# Patient Record
Sex: FEMALE | Race: BLACK OR AFRICAN AMERICAN | NOT HISPANIC OR LATINO | URBAN - METROPOLITAN AREA
[De-identification: names, ages, dates, MRNs, and addresses within clinical notes are randomized per-mention and may not be internally consistent; named-entity substitution may affect disease eponyms.]

---

## 2020-07-25 ENCOUNTER — TELEPHONE ENCOUNTER (OUTPATIENT)
Dept: URBAN - METROPOLITAN AREA CLINIC 13 | Facility: CLINIC | Age: 60
End: 2020-07-25

## 2020-07-26 ENCOUNTER — TELEPHONE ENCOUNTER (OUTPATIENT)
Dept: URBAN - METROPOLITAN AREA CLINIC 13 | Facility: CLINIC | Age: 60
End: 2020-07-26

## 2021-02-19 ENCOUNTER — CLAIMS CREATED FROM THE CLAIM WINDOW (OUTPATIENT)
Dept: URBAN - METROPOLITAN AREA MEDICAL CENTER 19 | Facility: MEDICAL CENTER | Age: 61
End: 2021-02-19
Payer: COMMERCIAL

## 2021-02-19 DIAGNOSIS — K92.0 COFFEE GROUND EMESIS: ICD-10-CM

## 2021-02-19 DIAGNOSIS — R10.816 ABDOMINAL TENDERNESS, EPIGASTRIC: ICD-10-CM

## 2021-02-19 DIAGNOSIS — R93.3 ABNORMAL FINDINGS ON DIAGNOSTIC IMAGING OF OTHER PARTS OF DIGESTIVE TRACT: ICD-10-CM

## 2021-02-19 DIAGNOSIS — K21.9 GASTRO-ESOPHAGEAL REFLUX DISEASE WITHOUT ESOPHAGITIS: ICD-10-CM

## 2021-02-19 PROCEDURE — 99253 IP/OBS CNSLTJ NEW/EST LOW 45: CPT | Performed by: INTERNAL MEDICINE

## 2021-02-20 ENCOUNTER — CLAIMS CREATED FROM THE CLAIM WINDOW (OUTPATIENT)
Dept: URBAN - METROPOLITAN AREA MEDICAL CENTER 19 | Facility: MEDICAL CENTER | Age: 61
End: 2021-02-20
Payer: COMMERCIAL

## 2021-02-20 DIAGNOSIS — R93.3 ABNORMAL FINDINGS ON DIAGNOSTIC IMAGING OF OTHER PARTS OF DIGESTIVE TRACT: ICD-10-CM

## 2021-02-20 DIAGNOSIS — K21.9 GASTRO-ESOPHAGEAL REFLUX DISEASE WITHOUT ESOPHAGITIS: ICD-10-CM

## 2021-02-20 DIAGNOSIS — R10.13 EPIGASTRIC PAIN: ICD-10-CM

## 2021-02-20 PROCEDURE — 99233 SBSQ HOSP IP/OBS HIGH 50: CPT | Performed by: INTERNAL MEDICINE

## 2021-02-21 ENCOUNTER — LAB OUTSIDE AN ENCOUNTER (OUTPATIENT)
Dept: URBAN - METROPOLITAN AREA CLINIC 113 | Facility: CLINIC | Age: 61
End: 2021-02-21

## 2021-02-21 ENCOUNTER — TELEPHONE ENCOUNTER (OUTPATIENT)
Dept: URBAN - METROPOLITAN AREA CLINIC 113 | Facility: CLINIC | Age: 61
End: 2021-02-21

## 2022-10-21 ENCOUNTER — LAB OUTSIDE AN ENCOUNTER (OUTPATIENT)
Dept: URBAN - METROPOLITAN AREA CLINIC 113 | Facility: CLINIC | Age: 62
End: 2022-10-21

## 2022-10-21 ENCOUNTER — WEB ENCOUNTER (OUTPATIENT)
Dept: URBAN - METROPOLITAN AREA CLINIC 113 | Facility: CLINIC | Age: 62
End: 2022-10-21

## 2022-10-21 ENCOUNTER — OFFICE VISIT (OUTPATIENT)
Dept: URBAN - METROPOLITAN AREA CLINIC 113 | Facility: CLINIC | Age: 62
End: 2022-10-21
Payer: COMMERCIAL

## 2022-10-21 VITALS
TEMPERATURE: 97.7 F | HEART RATE: 90 BPM | WEIGHT: 151 LBS | SYSTOLIC BLOOD PRESSURE: 125 MMHG | DIASTOLIC BLOOD PRESSURE: 87 MMHG | HEIGHT: 71 IN | BODY MASS INDEX: 21.14 KG/M2 | RESPIRATION RATE: 20 BRPM

## 2022-10-21 DIAGNOSIS — R11.2 NAUSEA AND VOMITING, UNSPECIFIED VOMITING TYPE: ICD-10-CM

## 2022-10-21 DIAGNOSIS — Z12.11 COLON CANCER SCREENING: ICD-10-CM

## 2022-10-21 DIAGNOSIS — K21.9 GASTROESOPHAGEAL REFLUX DISEASE, UNSPECIFIED WHETHER ESOPHAGITIS PRESENT: ICD-10-CM

## 2022-10-21 PROCEDURE — 99214 OFFICE O/P EST MOD 30 MIN: CPT | Performed by: NURSE PRACTITIONER

## 2022-10-21 RX ORDER — ONDANSETRON 4 MG/1
1 TABLET ON THE TONGUE AND ALLOW TO DISSOLVE TABLET, ORALLY DISINTEGRATING ORAL
Qty: 30 | Refills: 2 | OUTPATIENT
Start: 2022-10-21

## 2022-10-21 RX ORDER — PANTOPRAZOLE SODIUM 40 MG/1
1 TABLET TABLET, DELAYED RELEASE ORAL ONCE A DAY
Status: ACTIVE | COMMUNITY

## 2022-10-21 RX ORDER — BICTEGRAVIR SODIUM, EMTRICITABINE, AND TENOFOVIR ALAFENAMIDE FUMARATE 30; 120; 15 MG/1; MG/1; MG/1
AS DIRECTED TABLET ORAL
Status: ACTIVE | COMMUNITY

## 2022-10-21 NOTE — HPI-OTHER HISTORIES
Labs 10/13/2022:CBC: WBC 3.2, hemoglobin 11.4, MCV 85.7, platelet 183.  BMP normal with exception of protein 9.8.  LFTs normal TB 0.4, ALP 74, ALT 15, AST 36.  PT 12.1, INR 1.07.  Troponin less than 0.020.  proBNP 82.3.  PTT 29.1.  PT 12.1, INR 1.07.  Magnesium and phosphorus normal.  Chest x-ray 10/13/2022: No acute changes.

## 2022-10-21 NOTE — HPI-TODAY'S VISIT:
This is a 62-year-old female with a history of HIV on  HAART, hyperlipidemia, abdominal pain, nausea, vomiting, and possible coffee-ground emesis (2021) presenting for long interval follow-up regarding "unable to keep anything down and acid reflux." She was initially seen in hospital consultation in February 2021 for epigastric pain and possible coffee-ground emesis.  She was on Eliquis and endorsed significant NSAID use.  CT scan was notable for appendicitis that was later determined to be possible thickening of the terminal ileum.  CT also showed multiple hepatic hypodensities concerning for cysts.  She was treated with PPI.  It was discussed that in this scopic evaluation was likely not to  given the fact that there was no obvious GI bleeding.  She was discharged from the hospital before further intervention.  Following discharge, a voicemail was left for her to call and schedule a follow-up visit to discuss colonoscopy  to rule out Crohn's disease.  She is taking pantoprazole 40 mg daily.  She has been on this medication for years.  She also takes Pepcid.  She has infrequent breakthrough heartburn.  A week ago, she began to experience nausea and vomiting.  She was unable to tolerate any solid food for days.  She reports a "funny feeling in my throat."  This is further described as an irritation.  She denies a globus sensation or dysphagia.  She reports a sensation of feeling raw throughout her chest.  She attributes this to her vomiting which has not occurred in the last 3 days.  She is tolerating liquids and has been able to tolerate solids today.  She denies hematemesis.  She reports similar episodes occurring twice a year.  She is having regular bowel movements without red blood or melena.  She denies any other abdominal symptoms. She denies the use of marijuana.  She reports she has used it in the past and, because of side effects, discontinued it.  She reports a family history of colon cancer in a second-degree relative (maternal aunt) and pancreatic cancer (maternal aunt). She has not had a prior colonoscopy.  She requires 2 Excedrin daily in order to control headaches.

## 2022-10-23 ENCOUNTER — DASHBOARD ENCOUNTERS (OUTPATIENT)
Age: 62
End: 2022-10-23

## 2022-10-24 PROBLEM — 235595009: Status: ACTIVE | Noted: 2022-10-21

## 2022-10-24 PROBLEM — 16932000: Status: ACTIVE | Noted: 2022-10-21

## 2022-10-25 PROBLEM — 305058001: Status: ACTIVE | Noted: 2022-10-21

## 2022-11-08 ENCOUNTER — TELEPHONE ENCOUNTER (OUTPATIENT)
Dept: URBAN - METROPOLITAN AREA CLINIC 113 | Facility: CLINIC | Age: 62
End: 2022-11-08

## 2022-11-09 ENCOUNTER — OFFICE VISIT (OUTPATIENT)
Dept: URBAN - METROPOLITAN AREA SURGERY CENTER 25 | Facility: SURGERY CENTER | Age: 62
End: 2022-11-09
Payer: COMMERCIAL

## 2022-11-09 ENCOUNTER — TELEPHONE ENCOUNTER (OUTPATIENT)
Dept: URBAN - METROPOLITAN AREA CLINIC 113 | Facility: CLINIC | Age: 62
End: 2022-11-09

## 2022-11-09 DIAGNOSIS — R11.2 NAUSEA AND VOMITING: ICD-10-CM

## 2022-11-09 DIAGNOSIS — K21.9 ESOPHAGEAL REFLUX: ICD-10-CM

## 2022-11-09 DIAGNOSIS — B37.81 CANDIDA ESOPHAGITIS: ICD-10-CM

## 2022-11-09 DIAGNOSIS — R13.10 ODYNOPHAGIA: ICD-10-CM

## 2022-11-09 PROCEDURE — 43239 EGD BIOPSY SINGLE/MULTIPLE: CPT | Performed by: INTERNAL MEDICINE

## 2022-11-09 PROCEDURE — G8907 PT DOC NO EVENTS ON DISCHARG: HCPCS | Performed by: INTERNAL MEDICINE

## 2022-11-09 RX ORDER — BICTEGRAVIR SODIUM, EMTRICITABINE, AND TENOFOVIR ALAFENAMIDE FUMARATE 30; 120; 15 MG/1; MG/1; MG/1
AS DIRECTED TABLET ORAL
Status: ACTIVE | COMMUNITY

## 2022-11-09 RX ORDER — ONDANSETRON 4 MG/1
1 TABLET ON THE TONGUE AND ALLOW TO DISSOLVE TABLET, ORALLY DISINTEGRATING ORAL
Qty: 30 | Refills: 2 | Status: ACTIVE | COMMUNITY
Start: 2022-10-21

## 2022-11-09 RX ORDER — PANTOPRAZOLE SODIUM 40 MG/1
1 TABLET TABLET, DELAYED RELEASE ORAL ONCE A DAY
Status: ACTIVE | COMMUNITY

## 2022-11-09 RX ORDER — FLUCONAZOLE 100 MG/1
1 TABLET TABLET ORAL DAILY
Qty: 10 TABLET | Refills: 0 | OUTPATIENT
Start: 2022-11-09 | End: 2022-11-19

## 2022-11-17 ENCOUNTER — OFFICE VISIT (OUTPATIENT)
Dept: URBAN - METROPOLITAN AREA SURGERY CENTER 25 | Facility: SURGERY CENTER | Age: 62
End: 2022-11-17

## 2022-11-17 PROBLEM — 30233002 ODYNOPHAGIA: Status: ACTIVE | Noted: 2022-11-17

## 2022-11-17 PROBLEM — 249496004 ESOPHAGEAL REFLUX: Status: ACTIVE | Noted: 2022-11-17

## 2022-12-05 ENCOUNTER — OFFICE VISIT (OUTPATIENT)
Dept: URBAN - METROPOLITAN AREA SURGERY CENTER 25 | Facility: SURGERY CENTER | Age: 62
End: 2022-12-05

## 2023-01-23 ENCOUNTER — OFFICE VISIT (OUTPATIENT)
Dept: URBAN - METROPOLITAN AREA CLINIC 113 | Facility: CLINIC | Age: 63
End: 2023-01-23

## 2023-01-23 NOTE — HPI-TODAY'S VISIT:
This is a 62-year-old female with a history of HIV on HAART, hyperlipidemia, abdominal pain, nausea, vomiting, and possible coffee-ground emesis (2021) presenting for follow-up. She was last seen 10/21/2022 after a long interval.  She was taking pantoprazole 40 mg daily.  She was also taking Pepcid.  She had infrequent breakthrough heartburn.  She had recently had an episode of nausea and vomiting.  Her symptoms had improved at time of her visit.  It was discussed this may be viral.  She reported symptoms of this nature occurring twice a year.  She is taking Excedrin on a daily basis and was at risk for peptic ulcer disease.  She was concerned regarding her stomach and esophagus.  She was scheduled for an EGD.  She was provided a prescription for ondansetron for symptomatic relief.  She was due colonoscopy for screening.  This was also scheduled. Colonoscopy was scheduled 12/5/2022.  She did not show for the procedure. EGD 11/9/2022:Severe candidiasis esophagitis with no bleeding status post biopsy, normal stomach, normal examined duodenum.  Esophageal biopsies demonstrated acute Candida esophagitis, positive for pseudohyphae and yeast forms consistent with Candida.  Negative for Sarmiento's or eosinophilic esophagitis. She was prescribed Diflucan 100 mg daily for 10 days at the time of the EGD.